# Patient Record
(demographics unavailable — no encounter records)

---

## 2025-06-12 NOTE — HISTORY OF PRESENT ILLNESS
[FreeTextEntry1] : CC: BPH, LUTS, ED  Doing well today LUTS remain stable Nocturia stable x 2-3, not bothersome to patient.  On tadalafil 20 mg prn for ED Erections good with Cialis  PSA  3.3ng/mL 05/07/2025 PSA 4.5 ng/ml 2/2024 PSA 4.66 (6/23), 6.87 (12/22), 5.35 (11/22), 3.2 (12/20)  PVR today was 11 cc  PVR 8 cc last visit  MRI 2022 reviewed; large gland, PIRAD1; 103 cc gland

## 2025-06-12 NOTE — ASSESSMENT
[FreeTextEntry1] : DX: BPH, ED, Elevated PSA  PLAN: PVR was 11 cc PSA stable at 3.3 ng/mL from 5/2025 Stable LUTS Stable ED Continue Cialis prn  RTC in 1 year   Wilberto Garcia MD, FACS, FRCS  of Urology [facility] Director of Laparoscopic and Robotic Surgery [facility] Director of Urology, [facility] Professor of Urology   (Office) [phone] (Cell)  [phone] [email or web address]     I, , personally performed the evaluation and management (E/M) services for this established patient who presents today with (a) new problem(s)/exacerbation of (an) existing condition(s). That E/M includes conducting the clinically appropriate interval history &/or exam, assessing all new/exacerbated conditions, and establishing a new plan of care. Today, my JOSAFAT, Cintia Puente, was here to observe my evaluation and management service for this new problem/exacerbated condition and follow the plan of care established by me going forward.

## 2025-06-12 NOTE — ASSESSMENT
[FreeTextEntry1] : DX: BPH, ED, Elevated PSA  PLAN: PVR was 11 cc PSA stable at 3.3 ng/mL from 5/2025 Stable LUTS Stable ED Continue Cialis prn  RTC in 1 year   Wilberto Garcia MD, FACS, FRCS  of Urology [facility] Director of Laparoscopic and Robotic Surgery [facility] Director of Urology, [facility] Professor of Urology   (Office) [phone] (Cell)  [phone] [email or web address]     I, , personally performed the evaluation and management (E/M) services for this established patient who presents today with (a) new problem(s)/exacerbation of (an) existing condition(s). That E/M includes conducting the clinically appropriate interval history &/or exam, assessing all new/exacerbated conditions, and establishing a new plan of care. Today, my JOSAFAT, Cintia Puente, was here to observe my evaluation and management service for this new problem/exacerbated condition and follow the plan of care established by me going forward.